# Patient Record
Sex: FEMALE | Race: OTHER
[De-identification: names, ages, dates, MRNs, and addresses within clinical notes are randomized per-mention and may not be internally consistent; named-entity substitution may affect disease eponyms.]

---

## 2020-02-06 ENCOUNTER — HOSPITAL ENCOUNTER (OUTPATIENT)
Dept: HOSPITAL 72 - PAN | Age: 60
Discharge: HOME | End: 2020-02-06
Payer: COMMERCIAL

## 2020-02-06 VITALS — SYSTOLIC BLOOD PRESSURE: 125 MMHG | DIASTOLIC BLOOD PRESSURE: 65 MMHG

## 2020-02-06 VITALS — HEIGHT: 62 IN | WEIGHT: 126 LBS | BODY MASS INDEX: 23.19 KG/M2

## 2020-02-06 DIAGNOSIS — Z90.49: ICD-10-CM

## 2020-02-06 DIAGNOSIS — Z80.0: ICD-10-CM

## 2020-02-06 DIAGNOSIS — R00.1: ICD-10-CM

## 2020-02-06 DIAGNOSIS — R10.9: Primary | ICD-10-CM

## 2020-02-06 NOTE — CONSULTATION
DATE OF CONSULTATION:  02/06/2020

GASTROENTEROLOGY CONSULTATION



CONSULTING PHYSICIAN:  Jaleel Cramer M.D.



REFERRING PHYSICIAN:  Gilbert Machado M.D.



TIME OF EVALUATION:  1400.



HISTORY OF PRESENT ILLNESS:  This is a 59-year-old female patient with a

past medical history of bradycardia presents today with complaint of right

lower quadrant pain.  The patient denies any constipation or diarrhea.

The patient denies any nausea or vomiting.  The patient stated that she

has a family history of colon cancer in which her brother was recently

diagnosed with colon cancer stage IV and currently on chemotherapy.  She

was instructed by her primary care physician to obtain a routine

colonoscopy because of this.  The patient is alert and oriented x4.  There

are no signs of abuse or neglect.  The patient is not a fall risk.  The

patient's last colonoscopy was stated to be approximately 5 years ago.



PAST MEDICAL HISTORY:  Bradycardia.



PAST SURGICAL HISTORY:  Cholecystectomy.



MEDICATIONS:  The patient is on vitamin D3, Q10, _______.



FAMILY HISTORY:  Brother has colon cancer.  Family history of

stroke.



SOCIAL HISTORY:  The patient has no ETOH use, tobacco, or drug use.



PHYSICAL EXAMINATION:

VITAL SIGNS:  Temperature is 97.4, blood pressure 125/65, pulse 54, oxygen

saturation is 98, height 5 feet 2 inches, weight 126.4 pounds.  The

patient denies any weight loss.

GENERAL:  No apparent distress.  Alert and oriented x4.

Ambulatory.

HEENT:  Head is normocephalic, atraumatic.  PERRLA.

NECK:  Supple.

CARDIOVASCULAR:  Bradycardic.

LUNGS:  Clear bilaterally to auscultation.  No apparent distress.

ABDOMEN:  Soft.  Mild tenderness right lower quadrant.  Nondistended.  No

rebound or guarding noted.

EXTREMITIES:  No pedal edema noted.  No cyanosis.  No clubbing

noted.



ASSESSMENT:  This is a 59-year-old female patient with family history of

colon cancer who presents today for a routine colonoscopy.



1. Family history of colon cancer.

2. Abdominal pain.

3. History of cholecystectomy.

4. Bradycardia.



PLAN:  Plan is to schedule the patient for colonoscopy.  The patient will

require prior authorization prior scheduling.  Colonoscopy preparation and

prepping was instructed and acknowledged by the patient.  We will contact

the patient in regards to the colonoscopy once authorization is

approved.



I would like to thank Dr. Machado for this consultation.







  ______________________________________________

  Jaleel Cramer M.D.





  ______________________________________________

  [facility]





DR:  JIMMIE

D:  02/06/2020 14:22

T:  02/06/2020 17:39

JOB#:  [id number]

CC: